# Patient Record
Sex: MALE | Race: WHITE | NOT HISPANIC OR LATINO | URBAN - METROPOLITAN AREA
[De-identification: names, ages, dates, MRNs, and addresses within clinical notes are randomized per-mention and may not be internally consistent; named-entity substitution may affect disease eponyms.]

---

## 2017-03-22 ENCOUNTER — OFFICE VISIT (OUTPATIENT)
Dept: MEDICAL GROUP | Facility: LAB | Age: 37
End: 2017-03-22
Payer: COMMERCIAL

## 2017-03-22 VITALS
DIASTOLIC BLOOD PRESSURE: 66 MMHG | HEART RATE: 77 BPM | HEIGHT: 71 IN | SYSTOLIC BLOOD PRESSURE: 112 MMHG | BODY MASS INDEX: 20.9 KG/M2 | WEIGHT: 149.25 LBS | RESPIRATION RATE: 16 BRPM | TEMPERATURE: 98.2 F | OXYGEN SATURATION: 97 %

## 2017-03-22 DIAGNOSIS — J45.30 MILD PERSISTENT ASTHMA WITHOUT COMPLICATION: ICD-10-CM

## 2017-03-22 DIAGNOSIS — K22.2 LOWER ESOPHAGEAL RING (SCHATZKI): ICD-10-CM

## 2017-03-22 DIAGNOSIS — J30.1 SEASONAL ALLERGIC RHINITIS DUE TO POLLEN: ICD-10-CM

## 2017-03-22 PROCEDURE — 99204 OFFICE O/P NEW MOD 45 MIN: CPT | Performed by: FAMILY MEDICINE

## 2017-03-22 RX ORDER — ALBUTEROL SULFATE 90 UG/1
2 AEROSOL, METERED RESPIRATORY (INHALATION) EVERY 6 HOURS PRN
Qty: 8.5 G | Refills: 11 | Status: SHIPPED | OUTPATIENT
Start: 2017-03-22 | End: 2019-01-08 | Stop reason: SDUPTHER

## 2017-03-22 ASSESSMENT — PATIENT HEALTH QUESTIONNAIRE - PHQ9: CLINICAL INTERPRETATION OF PHQ2 SCORE: 0

## 2017-03-22 NOTE — PROGRESS NOTES
"Lukasz Beck is a 36 y.o. male here for   Chief Complaint   Patient presents with   • Establish Care       HPI:  Lukasz is a very pleasant 36 y.o. Male. He has 3 children. He is planning to become a life kidney donor to one of his friends who has polycystic kidneys.    1. Mild persistent asthma without complication  This is chronic. Patient has a prescription for Advair 100/50 µg. He uses this twice a day. He needs a refill of this medication. He does not have an albuterol inhaler because he has not used this in over 2 years. He does not have chronic cough or wheezing. He has never needed hospitalization. This is worse with allergies.    2. Seasonal allergic rhinitis due to pollen  This is chronic. Patient has had severe allergies when he was a child. 2 years ago, he started using Flonase. Use this for several months and his allergies seemed to dissipate. He has not needed this since. Now, he has postnasal drip and a \"tickle in his throat\". He denies any sneezing, itchy eyes, watery eyes     3. Lower esophageal ring (Schatzki)  this is chronic. Patient was found to have a Schatzki ring as a child. He did have dilation. He is now having difficulty swallowing tough foods. He denies any vomiting, weight loss, or bowel changes.        Current medicines (including changes today)  Current Outpatient Prescriptions   Medication Sig Dispense Refill   • fluticasone-salmeterol (ADVAIR DISKUS) 100-50 MCG/DOSE AEROSOL POWDER, BREATH ACTIVATED Inhale 1 Puff by mouth every 12 hours. 1 Inhaler 11   • albuterol 108 (90 BASE) MCG/ACT Aero Soln inhalation aerosol Inhale 2 Puffs by mouth every 6 hours as needed for Shortness of Breath. 8.5 g 11   • Fluticasone Propionate (FLONASE NA) Spray  in nose.       No current facility-administered medications for this visit.     He  has a past medical history of ASTHMA and Lower esophageal ring (Schatzki).  He  has past surgical history that includes appendectomy (1992); acl " "reconstruction (); dental extraction(s) (); and acl reconstruction scope (2011).  Social History   Substance Use Topics   • Smoking status: Never Smoker    • Smokeless tobacco: Never Used   • Alcohol Use: Yes      Comment: 2 per week     Social History     Social History Narrative     Family History   Problem Relation Age of Onset   • Diabetes     • Heart Disease Mother    • Lung Disease Mother    • Diabetes Father    • Genetic Sister      lupus      Family Status   Relation Status Death Age   • Mother     • Father Alive          ROS  Positive for dysphagia  All other systems reviewed and are negative     Objective:     Blood pressure 112/66, pulse 77, temperature 36.8 °C (98.2 °F), resp. rate 16, height 1.803 m (5' 11\"), weight 67.7 kg (149 lb 4 oz), SpO2 97 %. Body mass index is 20.83 kg/(m^2).  Physical Exam:    Constitutional: Alert, no distress.  Skin: Warm, dry, good turgor, no rashes in visible areas.  Eye: Equal, round and reactive, conjunctiva clear, lids normal.  ENMT: Lips without lesions, good dentition, oropharynx clear. TM's pearly gray with normal light reflexes bilaterally  Neck: Trachea midline, no masses, no thyromegaly. No cervical or supraclavicular lymphadenopathy.  Respiratory: Unlabored respiratory effort, lungs clear to auscultation bilaterally, no wheezes, no ronchi.  Cardiovascular: Normal S1, S2, RRR, no murmur, no edema.  Abdomen: Soft, non-tender, no masses, no hepatosplenomegaly.  Psych: Alert and oriented x3, normal affect and mood.      Assessment and Plan:   The following treatment plan was discussed    1. Mild persistent asthma without complication  This is chronic, stable  Continue Advair twice a day  Prescription for albuterol given to have on hand  - fluticasone-salmeterol (ADVAIR DISKUS) 100-50 MCG/DOSE AEROSOL POWDER, BREATH ACTIVATED; Inhale 1 Puff by mouth every 12 hours.  Dispense: 1 Inhaler; Refill: 11  - albuterol 108 (90 BASE) MCG/ACT Aero Soln " inhalation aerosol; Inhale 2 Puffs by mouth every 6 hours as needed for Shortness of Breath.  Dispense: 8.5 g; Refill: 11    2. Seasonal allergic rhinitis due to pollen  Chronic, stable  6. Restart Flonase, discussed irritation in the back of throat was likely secondary to allergic symptoms. If this does not work, we may need to start a PPI    3. Lower esophageal ring (Jenatzki)  Chronic, worsening  Patient will make an appointment with gastroenterology for possible dilation      Records requested.  Followup: Return in about 1 year (around 3/22/2018), or if symptoms worsen or fail to improve, for Long.         This note was created using voice recognition software. I have made every reasonable attempt to correct errors, however, I do anticipate some grammatical errors.

## 2018-04-23 DIAGNOSIS — J45.30 MILD PERSISTENT ASTHMA WITHOUT COMPLICATION: ICD-10-CM

## 2019-01-08 ENCOUNTER — OFFICE VISIT (OUTPATIENT)
Dept: MEDICAL GROUP | Facility: LAB | Age: 39
End: 2019-01-08
Payer: COMMERCIAL

## 2019-01-08 VITALS
RESPIRATION RATE: 12 BRPM | DIASTOLIC BLOOD PRESSURE: 72 MMHG | SYSTOLIC BLOOD PRESSURE: 100 MMHG | BODY MASS INDEX: 21 KG/M2 | TEMPERATURE: 99 F | HEIGHT: 71 IN | WEIGHT: 150 LBS | HEART RATE: 80 BPM | OXYGEN SATURATION: 96 %

## 2019-01-08 DIAGNOSIS — J30.1 SEASONAL ALLERGIC RHINITIS DUE TO POLLEN: ICD-10-CM

## 2019-01-08 DIAGNOSIS — K22.2 LOWER ESOPHAGEAL RING (SCHATZKI): ICD-10-CM

## 2019-01-08 DIAGNOSIS — Z00.00 ROUTINE GENERAL MEDICAL EXAMINATION AT A HEALTH CARE FACILITY: ICD-10-CM

## 2019-01-08 DIAGNOSIS — J45.30 MILD PERSISTENT ASTHMA WITHOUT COMPLICATION: ICD-10-CM

## 2019-01-08 PROCEDURE — 99395 PREV VISIT EST AGE 18-39: CPT | Performed by: NURSE PRACTITIONER

## 2019-01-08 RX ORDER — ALBUTEROL SULFATE 90 UG/1
2 AEROSOL, METERED RESPIRATORY (INHALATION) EVERY 6 HOURS PRN
Qty: 3 INHALER | Refills: 3 | Status: SHIPPED | OUTPATIENT
Start: 2019-01-08 | End: 2019-11-11 | Stop reason: SDUPTHER

## 2019-01-08 ASSESSMENT — PATIENT HEALTH QUESTIONNAIRE - PHQ9: CLINICAL INTERPRETATION OF PHQ2 SCORE: 0

## 2019-01-08 NOTE — ASSESSMENT & PLAN NOTE
Chronic issue . Improving per pt.  Using advair daily in winter, in warmer months he reduces intake of advair to a few d per week.  Cold air triggers asthma but very rare need for albuterol.  Has not had flu shot.  Nonsmoker.

## 2019-01-08 NOTE — PROGRESS NOTES
"Chief Complaint   Patient presents with   • Annual Exam       HPI   Ed is a 37 yo est male here for annual exam. Doing well.  Denies any specific complaints or concerns.  Working full-time.   with 3 kids.  In the past year he had a unilateral nephrectomy and a vasectomy.  Nephrectomy was for kidney donation.  Mild persistent asthma without complication  Chronic issue . Improving per pt.  Using advair daily in winter, in warmer months he reduces intake of advair to a few d per week.  Cold air triggers asthma but very rare need for albuterol.  Has not had flu shot.  Nonsmoker.     Lower esophageal ring (Schatzki)  Not really bothering pt.   Not choking frequently.      Seasonal allergic rhinitis due to pollen  Chronic and controlled with flonase.  No nose bleeds.    Family History   Problem Relation Age of Onset   • Heart Disease Mother    • Lung Disease Mother    • Diabetes Father         ?   • Diabetes Unknown    • Genetic Sister         lupus      ROS   Constitutional feels generally healthy   Eyes denies blurry or double vision  ENT no pain or epistaxis, no unusual congestion   Cardiac denies chest pain or heart palpitations  Respiratory no hemoptysis, no unusual dyspnea, no cough   GI no pain, indigestion, or unexpected bowel change    denies dysuria or hematuria  Musculoskeletal no unusual or new pains   Skin no suspicious or concerning lesions   Neuro no unusual weakness or loss of function   Psych denies depression or anxiety  Lymph no new or unusual lumps or nodules    Exam:  Blood pressure 100/72, pulse 80, temperature 37.2 °C (99 °F), resp. rate 12, height 1.803 m (5' 11\"), weight 68 kg (150 lb), SpO2 96 %.  Constitutional: Alert, no distress, plus 3 vital signs  Skin:  Warm, dry, no rashes invisible areas  Eye: Equal, round and reactive, conjunctiva clear  ENMT: Lips without lesions, good dentition, oropharynx clear    Neck: Trachea midline, no masses, no thyromegaly  Respiratory: Unlabored " respiration, lungs clear to auscultation, no wheezes, no rhonchi  Cardiovascular: Normal rate and rhythm, no murmur, no edema  Abdomen: Soft, nontender, no masses or hepatosplenomegaly  Psych: Alert, pleasant, well-groomed, normal affect    Assessment /Plan / Medical Decision makin. Routine general medical examination at a health care facility  -recommend updated labs.  Declined flu shot.    - COMP METABOLIC PANEL; Future  - CBC WITH DIFFERENTIAL; Future  - Lipid Profile; Future    2. Mild persistent asthma without complication  -stable.  Continue same.  - fluticasone-salmeterol (ADVAIR DISKUS) 100-50 MCG/DOSE AEROSOL POWDER, BREATH ACTIVATED; Inhale 1 Puff by mouth every 12 hours.  Dispense: 3 Inhaler; Refill: 3  - albuterol 108 (90 Base) MCG/ACT Aero Soln inhalation aerosol; Inhale 2 Puffs by mouth every 6 hours as needed for Shortness of Breath.  Dispense: 3 Inhaler; Refill: 3    3. Lower esophageal ring (Schatzki)  -stable. Declined GI visit.     4. Seasonal allergic rhinitis due to pollen  -stable with flonase.

## 2019-11-11 ENCOUNTER — OFFICE VISIT (OUTPATIENT)
Dept: MEDICAL GROUP | Facility: LAB | Age: 39
End: 2019-11-11
Payer: COMMERCIAL

## 2019-11-11 VITALS
SYSTOLIC BLOOD PRESSURE: 112 MMHG | WEIGHT: 151 LBS | BODY MASS INDEX: 21.14 KG/M2 | RESPIRATION RATE: 12 BRPM | OXYGEN SATURATION: 96 % | HEART RATE: 88 BPM | DIASTOLIC BLOOD PRESSURE: 70 MMHG | HEIGHT: 71 IN | TEMPERATURE: 97.9 F

## 2019-11-11 DIAGNOSIS — K22.2 LOWER ESOPHAGEAL RING (SCHATZKI): ICD-10-CM

## 2019-11-11 DIAGNOSIS — Z00.00 PREVENTATIVE HEALTH CARE: ICD-10-CM

## 2019-11-11 DIAGNOSIS — J45.30 MILD PERSISTENT ASTHMA WITHOUT COMPLICATION: ICD-10-CM

## 2019-11-11 DIAGNOSIS — N52.9 ERECTILE DYSFUNCTION, UNSPECIFIED ERECTILE DYSFUNCTION TYPE: ICD-10-CM

## 2019-11-11 DIAGNOSIS — J30.1 SEASONAL ALLERGIC RHINITIS DUE TO POLLEN: ICD-10-CM

## 2019-11-11 PROCEDURE — 99214 OFFICE O/P EST MOD 30 MIN: CPT | Performed by: NURSE PRACTITIONER

## 2019-11-11 RX ORDER — ALBUTEROL SULFATE 90 UG/1
2 AEROSOL, METERED RESPIRATORY (INHALATION) EVERY 6 HOURS PRN
Qty: 3 INHALER | Refills: 3 | Status: SHIPPED | OUTPATIENT
Start: 2019-11-11 | End: 2022-09-13 | Stop reason: SDUPTHER

## 2019-11-11 NOTE — ASSESSMENT & PLAN NOTE
"Chronic issue.  Winter is typically a trigger for his asthma.  Also mentions that animal dander / dust are triggers.  Tells me that asthma is currently well controlled with advair bid.  Using albuterol very rarely \"almost never.\"    "

## 2019-11-11 NOTE — ASSESSMENT & PLAN NOTE
Chronic issue.  Not too bothersome for pt right now, rarely bothering pt.  Occasional thick meats get a bit stuck occasionally - relieved by water intake.

## 2019-11-11 NOTE — PROGRESS NOTES
"Chief Complaint   Patient presents with   • Asthma     med refill        HPI   Ed is a 37 yo est male here to follow-up on chronic issues, also with complaint of new onset difficulty obtaining and maintaining an erection, see below.    #1-Erectile dysfunction: New issue for the patient over the past year and a half.  He tells me that he has had trouble obtaining and maintaining an erection ever since he donated a kidney 1.5 yrs ago in .  He is been reading about, difficulties with erections after kidney donation and would like to see a urologist for further investigation.  He denies dyspareunia, hematuria, dysuria, flank pain    #2-mild persistent asthma without complication  Chronic issue.  Currently doing well, per patient.  Winter is typically a trigger for his asthma.  Also mentions that animal dander / dust are triggers.  Tells me that asthma is currently well controlled with advair bid.  Using albuterol very rarely \"almost never.\"      #3-seasonal allergic rhinitis due to pollen  States his allergies have been very mild for the past 10 years.  Using flonase a few x per year.     #4-lower esophageal ring (Schatzki)  Chronic issue.  Not too bothersome for pt right now, rarely bothering pt.  Occasional thick meats get a bit stuck occasionally - relieved by water intake.       Past medical, surgical, family, and social history is reviewed and updated in Epic chart by me today.   Medications and allergies reviewed and updated in Epic chart by me today.     ROS:   As documented in history of present illness above    Exam:  /70 (BP Location: Right arm, Patient Position: Sitting, BP Cuff Size: Adult)   Pulse 88   Temp 36.6 °C (97.9 °F)   Resp 12   Ht 1.803 m (5' 11\")   Wt 68.5 kg (151 lb)   SpO2 96%   Constitutional: Alert, no distress, plus 3 vital signs  Skin:  Warm, dry, no rashes invisible areas  Eye: Equal, round and reactive, conjunctiva clear  ENMT: Lips without lesions, good dentition, oropharynx " clear    Neck: Trachea midline, no masses, no thyromegaly  Respiratory: Unlabored respiration, lungs clear to auscultation, no wheezes, no rhonchi  Cardiovascular: Normal rate and rhythm, no murmur, no edema  Psych: Alert, pleasant, well-groomed, normal affect    Assessment / Plan / Medical Decision makin. Mild persistent asthma without complication  -Refilled all medications.  Very well controlled.  Flu vaccine is up-to-date  - albuterol 108 (90 Base) MCG/ACT Aero Soln inhalation aerosol; Inhale 2 Puffs by mouth every 6 hours as needed for Shortness of Breath.  Dispense: 3 Inhaler; Refill: 3  - fluticasone-salmeterol (ADVAIR DISKUS) 100-50 MCG/DOSE AEROSOL POWDER, BREATH ACTIVATED; Inhale 1 Puff by mouth every 12 hours.  Dispense: 3 Inhaler; Refill: 3    2. Seasonal allergic rhinitis due to pollen  -Stable.  Continue same.    3. Lower esophageal ring (Schatzki)  -Currently stable.  Declines referrals to GI.    4. Preventative health care  - Comp Metabolic Panel; Future  - CBC WITH DIFFERENTIAL; Future  - Lipid Profile; Future    5. Erectile dysfunction, unspecified erectile dysfunction type  -Recommend a consult with urology and referral was placed.  - REFERRAL TO UROLOGY

## 2021-01-20 ENCOUNTER — OFFICE VISIT (OUTPATIENT)
Dept: MEDICAL GROUP | Facility: LAB | Age: 41
End: 2021-01-20
Payer: COMMERCIAL

## 2021-01-20 VITALS
OXYGEN SATURATION: 98 % | TEMPERATURE: 97.8 F | BODY MASS INDEX: 20.72 KG/M2 | HEIGHT: 71 IN | DIASTOLIC BLOOD PRESSURE: 64 MMHG | HEART RATE: 86 BPM | WEIGHT: 148 LBS | SYSTOLIC BLOOD PRESSURE: 98 MMHG

## 2021-01-20 DIAGNOSIS — J45.30 MILD PERSISTENT ASTHMA WITHOUT COMPLICATION: ICD-10-CM

## 2021-01-20 DIAGNOSIS — Z00.00 PREVENTATIVE HEALTH CARE: ICD-10-CM

## 2021-01-20 PROBLEM — Z52.4 KIDNEY DONOR: Status: ACTIVE | Noted: 2018-02-16

## 2021-01-20 PROCEDURE — 99213 OFFICE O/P EST LOW 20 MIN: CPT | Performed by: NURSE PRACTITIONER

## 2021-01-20 ASSESSMENT — PATIENT HEALTH QUESTIONNAIRE - PHQ9: CLINICAL INTERPRETATION OF PHQ2 SCORE: 0

## 2021-01-20 NOTE — PROGRESS NOTES
"CC  Asthma f/u    HPI  Ed is a 40-year-old established male here to follow-up on asthma.  Asthma doing well - cold air is a trigger, causing tightness periodically. No recent bouts of bronchitis or flare ups.  Starts advair in fall and continues through winter, takes a break from advair in summer.  Dogs / cats are triggers - does not have these in the home.  Typical use of albuterol is less than once per week as long as he is using Advair in the winter.  Exercises regularly at work,  [employer].  Allergies have been very minimal.    Prefers not to get a flu or pneumonia shot.      Past medical, surgical, family, and social history is reviewed and updated in Epic chart by me today.   Medications and allergies reviewed and updated in Epic chart by me today.     ROS:   As documented in history of present illness above    Exam:  BP (!) 98/64 (BP Location: Right arm, Patient Position: Sitting)   Pulse 86   Temp 36.6 °C (97.8 °F)   Ht 1.803 m (5' 11\")   Wt 67.1 kg (148 lb)   SpO2 98%   Constitutional: Alert, no distress, plus 3 vital signs  Skin:  Warm, dry, no rashes invisible areas  Eye: Equal, round and reactive, conjunctiva clear  Respiratory: Unlabored respiration, lungs clear to auscultation, no wheezes, no rhonchi  Cardiovascular: Normal rate and rhythm, no murmur, no edema  Psych: Alert, pleasant, well-groomed, normal affect    Assessment / Plan / Medical Decision making:   \"  1. Mild persistent asthma without complication  fluticasone-salmeterol (ADVAIR DISKUS) 100-50 MCG/DOSE AEROSOL POWDER, BREATH ACTIVATED   2. Preventative health care  Comp Metabolic Panel    CBC WITH DIFFERENTIAL    Lipid Profile   \"  Refilled Advair.  Has plenty of albuterol.  Declines influenza and pneumonia vaccines.  Counseled regarding Covid vaccination.  Recommend preventative lab work.  Follow-up yearly.    "

## 2022-02-10 DIAGNOSIS — J45.30 MILD PERSISTENT ASTHMA WITHOUT COMPLICATION: ICD-10-CM

## 2022-02-24 ENCOUNTER — OFFICE VISIT (OUTPATIENT)
Dept: MEDICAL GROUP | Facility: LAB | Age: 42
End: 2022-02-24
Payer: COMMERCIAL

## 2022-02-24 VITALS
BODY MASS INDEX: 21 KG/M2 | DIASTOLIC BLOOD PRESSURE: 66 MMHG | TEMPERATURE: 96.8 F | RESPIRATION RATE: 12 BRPM | HEIGHT: 71 IN | SYSTOLIC BLOOD PRESSURE: 96 MMHG | HEART RATE: 84 BPM | WEIGHT: 150 LBS | OXYGEN SATURATION: 95 %

## 2022-02-24 DIAGNOSIS — J45.30 MILD PERSISTENT ASTHMA WITHOUT COMPLICATION: ICD-10-CM

## 2022-02-24 DIAGNOSIS — Z00.00 WELL ADULT EXAM: ICD-10-CM

## 2022-02-24 DIAGNOSIS — K22.2 LOWER ESOPHAGEAL RING (SCHATZKI): ICD-10-CM

## 2022-02-24 PROCEDURE — 99396 PREV VISIT EST AGE 40-64: CPT | Performed by: NURSE PRACTITIONER

## 2022-02-24 RX ORDER — MONTELUKAST SODIUM 10 MG/1
10 TABLET ORAL DAILY
Qty: 30 TABLET | Refills: 5 | Status: SHIPPED | OUTPATIENT
Start: 2022-02-24 | End: 2023-01-09

## 2022-02-24 ASSESSMENT — PATIENT HEALTH QUESTIONNAIRE - PHQ9: CLINICAL INTERPRETATION OF PHQ2 SCORE: 0

## 2022-02-24 NOTE — PROGRESS NOTES
Subjective:     CC:   Chief Complaint   Patient presents with   • Annual Exam       HPI:   Ed is a 41 y.o. male who presents for annual exam.    Symptoms of asthma worse in winter - using albuterol 2-4 x everyday.  Uses advair 100/50 everyday, often misses second dose.   Not on singular. Needs albuterol much more often during work week when he is very physically active working at  [employer].   Asthma has improved with new mask mandate drop.  Known Schatzki ring noted age 15 due to choking easily on food, hasn't had an endoscopy since and ready to return there - choking easily on food of any type of mashed potatoes.      Bowels/ bladder moving well.       He  has a past medical history of ASTHMA and Lower esophageal ring (Gema).  He  has a past surgical history that includes appendectomy (1992); reconstruction, knee, acl (2003); dental extraction(s) (1997); reconstruction, knee, acl, arthroscopic (2/22/2011); vasectomy; and nephrectomy laparoscopic.    Family History   Problem Relation Age of Onset   • Heart Disease Mother    • Lung Disease Mother    • Diabetes Mother         dx age 60   • Dementia Father    • Diabetes Other    • Genetic Disorder Sister         lupus    • Cancer Sister 50        breast   • Lung Disease Brother         asthma     Social History     Tobacco Use   • Smoking status: Never Smoker   • Smokeless tobacco: Never Used   Substance Use Topics   • Alcohol use: Yes     Comment: 2 per week   • Drug use: No     He  reports being sexually active and has had partner(s) who are female.    Patient Active Problem List    Diagnosis Date Noted   • Kidney donor 02/16/2018   • Mild persistent asthma without complication 03/22/2017   • Seasonal allergic rhinitis due to pollen 03/22/2017   • Lower esophageal ring (Gema)      Current Outpatient Medications   Medication Sig Dispense Refill   • fluticasone-salmeterol (ADVAIR DISKUS) 100-50 MCG/DOSE AEROSOL POWDER, BREATH ACTIVATED Inhale 1 Puff every 12  "hours. 3 Each 3   • albuterol 108 (90 Base) MCG/ACT Aero Soln inhalation aerosol Inhale 2 Puffs by mouth every 6 hours as needed for Shortness of Breath. 3 Inhaler 3   • Fluticasone Propionate (FLONASE NA) Spray  in nose.       No current facility-administered medications for this visit.     No Known Allergies    Review of Systems   Constitutional: Negative for fever, chills and malaise/fatigue.   HENT: Negative for congestion.    Eyes: Negative for pain.   Respiratory: Negative for cough and shortness of breath.    Cardiovascular: Negative for chest pain and leg swelling.   Gastrointestinal: Negative for nausea, vomiting, abdominal pain and diarrhea.   Genitourinary: Negative for dysuria and hematuria.   Skin: Negative for rash.   Neurological: Negative for dizziness, focal weakness and headaches.   Endo/Heme/Allergies: Does not bruise/bleed easily.   Psychiatric/Behavioral: Negative for depression.  The patient is not nervous/anxious.      Objective:   BP (!) 96/66 (BP Location: Left arm, Patient Position: Sitting, BP Cuff Size: Adult)   Pulse 84   Temp 36 °C (96.8 °F)   Resp 12   Ht 1.803 m (5' 11\")   Wt 68 kg (150 lb)   SpO2 95%   BMI 20.92 kg/m²      Wt Readings from Last 4 Encounters:   02/24/22 68 kg (150 lb)   01/20/21 67.1 kg (148 lb)   11/11/19 68.5 kg (151 lb)   01/08/19 68 kg (150 lb)       Physical Exam:  Constitutional: Well-developed and well-nourished. Not diaphoretic. No distress.   Skin: Skin is warm and dry. No rash noted.  Head: Atraumatic without lesions.  Eyes: Conjunctivae and extraocular motions are normal. Pupils are equal, round, and reactive to light. No scleral icterus.   Ears:  External ears unremarkable. Tympanic membranes clear and intact.  Nose: Nares patent. Septum midline. Turbinates without erythema nor edema. No discharge.   Mouth/Throat: Tongue normal. Oropharynx is clear and moist. Posterior pharynx without erythema or exudates.  Neck: Supple, trachea midline. Normal " "range of motion. No thyromegaly present. No lymphadenopathy--cervical or supraclavicular.  Cardiovascular: Regular rate and rhythm, S1 and S2 without murmur, rubs, or gallops.    Respiratory: Effort normal. Clear to auscultation throughout. No adventitious sounds.   Abdomen: Soft, non tender, and without distention. Active bowel sounds in all four quadrants. No rebound, guarding, masses or HSM.  Extremities: No cyanosis, clubbing, erythema, nor edema. Distal pulses intact and symmetric.   Musculoskeletal: All major joints AROM full in all directions without pain.  Neurological: Alert and oriented x 3. Grossly non-focal. Strength and sensation grossly intact. DTRs 2+/3 and symmetric.   Psychiatric:  Behavior, mood, and affect are appropriate.      Assessment and Plan:   \"  1. Well adult exam  Comp Metabolic Panel    CBC WITH DIFFERENTIAL    Lipid Profile    TSH   2. Mild persistent asthma without complication  montelukast (SINGULAIR) 10 MG Tab   3. Lower esophageal ring (Schatzki)  Referral to Gastroenterology   \"  Recommend a full updated lab panel, last was approximately 2018. He is interested in seeing an allergy/asthma specialist as his brother is currently receiving a new treatment that has prevented asthma symptoms/caused him to rarely uses albuterol and he will email me with this information so that I may place a referral for him. He would like to see the allergy specialist that his brother is seeing.  Also placed a referral to GI as it sounds like he needs to have Schatzki's ring dilation and encouraged him to check his MyChart to have this scheduled ASAP and we discussed choking prevention in the meantime.  He did not need refills of any of his medications today.  I did start him on Singulair related to his frequent use of albuterol. Discussed how to take Singulair as well as potential side effects and he will contact me if this is not reducing his dependence on albuterol over the next 2 weeks.    "

## 2022-09-04 ENCOUNTER — OFFICE VISIT (OUTPATIENT)
Dept: URGENT CARE | Facility: CLINIC | Age: 42
End: 2022-09-04
Payer: COMMERCIAL

## 2022-09-04 VITALS
RESPIRATION RATE: 14 BRPM | HEART RATE: 84 BPM | BODY MASS INDEX: 23.1 KG/M2 | WEIGHT: 165 LBS | TEMPERATURE: 98.3 F | OXYGEN SATURATION: 95 % | HEIGHT: 71 IN | SYSTOLIC BLOOD PRESSURE: 112 MMHG | DIASTOLIC BLOOD PRESSURE: 78 MMHG

## 2022-09-04 DIAGNOSIS — S81.812A LACERATION OF LEFT LOWER EXTREMITY, INITIAL ENCOUNTER: ICD-10-CM

## 2022-09-04 PROCEDURE — 12001 RPR S/N/AX/GEN/TRNK 2.5CM/<: CPT | Performed by: NURSE PRACTITIONER

## 2022-09-04 RX ORDER — OMEPRAZOLE 20 MG/1
20 CAPSULE, DELAYED RELEASE ORAL
COMMUNITY
Start: 2022-06-29 | End: 2023-04-17

## 2022-09-04 RX ORDER — PANTOPRAZOLE SODIUM 40 MG/1
40 TABLET, DELAYED RELEASE ORAL 2 TIMES DAILY
COMMUNITY
Start: 2022-06-23 | End: 2023-04-17

## 2022-09-04 NOTE — PROGRESS NOTES
Lukasz Beck is a 41 y.o. male who presents for Wound Check (X1days, Piece of rock hit him in shin, pt is concerned it may not be healing right, deeper than pt thought, bleeding off and on pt states there are some different colored fluids as well )      HPI Lukasz is a 41-year-old male who presents with a puncture wound to his left shin.  He was working in his yard trying to take out a rock , using a sledge hammer. A piece of the rock broke off and flew into his lower leg causing a puncture wound. The wound was washed with soap and water then bandaged. It continues to bleed if he bumps it. No other aggravating or alleviating factors.   Tdap 2016    ROS    Allergies:     No Known Allergies    PMSFS Hx:  Past Medical History:   Diagnosis Date    ASTHMA     Lower esophageal ring (Gema)      Past Surgical History:   Procedure Laterality Date    RECONSTRUCTION, KNEE, ACL, ARTHROSCOPIC  2/22/2011    Performed by CHALO PATEL at SURGERY SAME DAY [facility] ORS    RECONSTRUCTION, KNEE, ACL  2003    left    DENTAL EXTRACTION(S)  1997    APPENDECTOMY  1992    NEPHRECTOMY LAPAROSCOPIC      left 2018    VASECTOMY       Family History   Problem Relation Age of Onset    Heart Disease Mother     Lung Disease Mother     Diabetes Mother         dx age 60    Dementia Father     Diabetes Other     Genetic Disorder Sister         lupus     Cancer Sister 50        breast    Lung Disease Brother         asthma     Social History     Tobacco Use    Smoking status: Never    Smokeless tobacco: Never   Substance Use Topics    Alcohol use: Yes     Comment: 2 per week       Problems:   Patient Active Problem List   Diagnosis    Mild persistent asthma without complication    Lower esophageal ring (Gema)    Seasonal allergic rhinitis due to pollen    Kidney donor       Medications:   Current Outpatient Medications on File Prior to Visit   Medication Sig Dispense Refill    omeprazole (PRILOSEC) 20 MG delayed-release capsule Take  "20 mg by mouth every day.      pantoprazole (PROTONIX) 40 MG Tablet Delayed Response Take 40 mg by mouth 2 times a day.      montelukast (SINGULAIR) 10 MG Tab Take 1 Tablet by mouth every day. 30 Tablet 5    fluticasone-salmeterol (ADVAIR DISKUS) 100-50 MCG/DOSE AEROSOL POWDER, BREATH ACTIVATED Inhale 1 Puff every 12 hours. 3 Each 3    albuterol 108 (90 Base) MCG/ACT Aero Soln inhalation aerosol Inhale 2 Puffs by mouth every 6 hours as needed for Shortness of Breath. 3 Inhaler 3    Fluticasone Propionate (FLONASE NA) Spray  in nose.       No current facility-administered medications on file prior to visit.          Objective:     /78   Pulse 84   Temp 36.8 °C (98.3 °F) (Temporal)   Resp 14   Ht 1.803 m (5' 11\")   Wt 74.8 kg (165 lb)   SpO2 95%   BMI 23.01 kg/m²     Physical Exam  Vitals and nursing note reviewed.   Cardiovascular:      Rate and Rhythm: Normal rate.      Pulses: Normal pulses.   Skin:     General: Skin is warm.      Capillary Refill: Capillary refill takes less than 2 seconds.          Neurological:      Mental Status: He is alert and oriented to person, place, and time.     Procedure: Laceration Repair   -Risks benefits and alternatives discussed including bleeding, nerve damage, infection, and poor cosmetic outcome discussed at length. Benefits and alternatives discussed. Consent was obtained for repair of laceration    Wound length 1 cm, location Left shin   straight laceration, subcut tissue visible   Wound NVI, No signs of tendon injury   Area extensively irrigated with NS, wound explored, No fb identified.   Under clean conditions, I  used topical lidocaine gel 2% and FreezeEase for local anesthia of the area. Good anesthesia was obtained. Under sterile conditions, I applied 1 sutures with 5.0 ethilon interrupted sutures with good wound edge approximation.  last tetanus booster within 10 years   Bleeding was controlled. There were no procedural complications. Patient tolerated " procedure well. Dressing was applied and wound care/follow up instructions were given.  Keep clean and dry for 24 hours then clean daily with mild soap and water. Return for s/s of infections ( swelling, pain, redness, pus, fever)   Suture removal 7-10  days.   Patients questions were answered.      Assessment /Associated Orders:      1. Laceration of left lower extremity, initial encounter              Medical Decision Making:    Pt is clinically stable at today's acute urgent care visit.  No acute distress noted. Appropriate for outpatient care at this time.   Acute problem today  that required surgical closure of open wound.   Tdap current   Educated in wound care.   SR 7-10 days.         Please note that this dictation was created using voice recognition software. I have worked with consultants from the vendor as well as technical experts from [facility] [employer] to optimize the interface. I have made every reasonable attempt to correct obvious errors, but I expect that there are errors of grammar and possibly content that I did not discover before finalizing the note.  This note was electronically signed by provider

## 2022-09-13 DIAGNOSIS — J45.30 MILD PERSISTENT ASTHMA WITHOUT COMPLICATION: ICD-10-CM

## 2022-09-14 RX ORDER — ALBUTEROL SULFATE 90 UG/1
2 AEROSOL, METERED RESPIRATORY (INHALATION) EVERY 6 HOURS PRN
Qty: 3 EACH | Refills: 3 | Status: SHIPPED | OUTPATIENT
Start: 2022-09-14 | End: 2023-04-17 | Stop reason: SDUPTHER

## 2022-09-14 NOTE — TELEPHONE ENCOUNTER
Received request via: Pharmacy    Was the patient seen in the last year in this department? Yes  2/24/22  Does the patient have an active prescription (recently filled or refills available) for medication(s) requested? No

## 2023-01-07 DIAGNOSIS — J45.30 MILD PERSISTENT ASTHMA WITHOUT COMPLICATION: ICD-10-CM

## 2023-01-09 RX ORDER — MONTELUKAST SODIUM 10 MG/1
10 TABLET ORAL DAILY
Qty: 30 TABLET | Refills: 5 | Status: SHIPPED | OUTPATIENT
Start: 2023-01-09 | End: 2023-04-17 | Stop reason: SDUPTHER

## 2023-01-09 NOTE — TELEPHONE ENCOUNTER
Received request via: Pharmacy    Was the patient seen in the last year in this department? Yes  2/24/2022  Does the patient have an active prescription (recently filled or refills available) for medication(s) requested? No    Does the patient have [facility] and need 100 day supply (blood pressure, diabetes and cholesterol meds only)? Patient does not have SCP

## 2023-04-17 ENCOUNTER — OFFICE VISIT (OUTPATIENT)
Dept: MEDICAL GROUP | Facility: LAB | Age: 43
End: 2023-04-17
Payer: COMMERCIAL

## 2023-04-17 VITALS
SYSTOLIC BLOOD PRESSURE: 98 MMHG | BODY MASS INDEX: 21.98 KG/M2 | WEIGHT: 157 LBS | OXYGEN SATURATION: 92 % | RESPIRATION RATE: 12 BRPM | TEMPERATURE: 96.9 F | HEART RATE: 78 BPM | HEIGHT: 71 IN | DIASTOLIC BLOOD PRESSURE: 64 MMHG

## 2023-04-17 DIAGNOSIS — Z23 NEED FOR PNEUMOCOCCAL VACCINATION: ICD-10-CM

## 2023-04-17 DIAGNOSIS — Z00.00 PREVENTATIVE HEALTH CARE: ICD-10-CM

## 2023-04-17 DIAGNOSIS — J45.30 MILD PERSISTENT ASTHMA WITHOUT COMPLICATION: ICD-10-CM

## 2023-04-17 DIAGNOSIS — K20.0 EOSINOPHILIC ESOPHAGITIS: ICD-10-CM

## 2023-04-17 PROCEDURE — 99214 OFFICE O/P EST MOD 30 MIN: CPT | Mod: 25 | Performed by: NURSE PRACTITIONER

## 2023-04-17 PROCEDURE — 90677 PCV20 VACCINE IM: CPT | Performed by: NURSE PRACTITIONER

## 2023-04-17 PROCEDURE — 90471 IMMUNIZATION ADMIN: CPT | Performed by: NURSE PRACTITIONER

## 2023-04-17 RX ORDER — ALBUTEROL SULFATE 90 UG/1
2 AEROSOL, METERED RESPIRATORY (INHALATION) EVERY 6 HOURS PRN
Qty: 3 EACH | Refills: 3 | Status: SHIPPED | OUTPATIENT
Start: 2023-04-17

## 2023-04-17 RX ORDER — MONTELUKAST SODIUM 10 MG/1
10 TABLET ORAL DAILY
Qty: 90 TABLET | Refills: 3 | Status: SHIPPED | OUTPATIENT
Start: 2023-04-17

## 2023-04-17 RX ORDER — FLUTICASONE PROPIONATE AND SALMETEROL 100; 50 UG/1; UG/1
1 POWDER RESPIRATORY (INHALATION) EVERY 12 HOURS
Qty: 3 EACH | Refills: 3 | Status: SHIPPED | OUTPATIENT
Start: 2023-04-17

## 2023-04-17 ASSESSMENT — PATIENT HEALTH QUESTIONNAIRE - PHQ9: CLINICAL INTERPRETATION OF PHQ2 SCORE: 0

## 2023-04-17 NOTE — PROGRESS NOTES
"Chief Complaint   Patient presents with    Medication Management     Refill advair       HPI:  Ed is 43 yo est male here to f/u on chronic issues:     Eosinophilic esophagitis  Chronic issue.  Followed by GI and an allergist.  Tried PPI therapy - didn't help.  Had repeat biopsy in the past year which showed EE.  On advair.    Now seeing an allergist - [facility]-  Hoping this will help.  (Dr. Scherer).  Plan is possibly starting allergy shots.  Struggling to pinpoint exact triggers that set off allergy and asthma symptoms.  Has not frequently had to take any type of corticosteroid.  Does not smoke.    Mild persistent asthma without complication  Chronic issue.  Flaring up along with allergies during our current transition from winter to spring.  On advair bid, flonase, singular and albuterol prn.  No hx of antihistamine use helping.  Does not have a scheduled follow-up with his allergist at this time.    Exam:  BP 98/64 (BP Location: Left arm, Patient Position: Sitting, BP Cuff Size: Adult)   Pulse 78   Temp 36.1 °C (96.9 °F)   Resp 12   Ht 1.803 m (5' 11\")   Wt 71.2 kg (157 lb)   SpO2 92%   Gen: NAD  Resp: nonlabored.  Able to speak in full sentences. CTA bilaterally.   CV: Regular rate and rhythm  Psy: pleasant / cooperative.   Neuro:  Alert and oriented x 3      A/P:  1. Eosinophilic esophagitis  -Reviewed notes from November's endoscopy and pathology report.  His plan is to follow-up with GI and then his allergist for a more definitive long-term plan.  At this point he would like to keep his Advair at current dosage, continue Flonase and Singulair.  He was interested in another opinion through a gastroenterologist and I encouraged him to consult Dr. Jimenez at [facility].    2. Mild persistent asthma without complication  -Stable.  Discussed the use of rescue corticosteroids as needed for increased wheezing, torturous allergy symptoms or bronchitis type symptoms that do not resolve within 10 " days.  -Encouraged him to make a follow-up with his allergist.  - montelukast (SINGULAIR) 10 MG Tab; Take 1 Tablet by mouth every day.  Dispense: 90 Tablet; Refill: 3  - fluticasone-salmeterol (ADVAIR) 100-50 MCG/ACT AEROSOL POWDER, BREATH ACTIVATED; Inhale 1 Puff every 12 hours.  Dispense: 3 Each; Refill: 3  - albuterol 108 (90 Base) MCG/ACT Aero Soln inhalation aerosol; Inhale 2 Puffs every 6 hours as needed for Shortness of Breath.  Dispense: 3 Each; Refill: 3    3. Need for pneumococcal vaccination  -Updated PCV 20.  Patient was counseled regarding all immunizations, what the patient is being immunized against, possible side effects, and the importance of immunization.  - Pneumococcal Conjugate Vaccine 20-Valent (19 yrs+)    4. Preventative health care  -Recommend updated lab panel.  - TSH WITH REFLEX TO FT4; Future  - Lipid Profile; Future  - Comp Metabolic Panel; Future  - CBC WITH DIFFERENTIAL; Future     Hcm:  recommend colonoscopy age 45.  Psa age 50.

## 2023-04-17 NOTE — ASSESSMENT & PLAN NOTE
Chronic issue.  Followed by GI.  Tried PPI therapy - didn't help.  Had repeat biopsy in the past year which showed EE.  On advair.    Now seeing an allergist - [facility]-  Hoping this will help.  (Dr. Scherer).  Plan is possibly starting allergy shot.

## 2023-04-17 NOTE — ASSESSMENT & PLAN NOTE
Chronic issue.  Flaring up along with allergies during our current transition from winter to spring.  On advair bid, flonase, singular and albuterol prn.  No hx of antihistamine use helping.

## 2024-02-08 ENCOUNTER — APPOINTMENT (OUTPATIENT)
Dept: MEDICAL GROUP | Facility: LAB | Age: 44
End: 2024-02-08
Payer: COMMERCIAL

## 2024-02-08 ENCOUNTER — OFFICE VISIT (OUTPATIENT)
Dept: MEDICAL GROUP | Facility: LAB | Age: 44
End: 2024-02-08
Payer: COMMERCIAL

## 2024-02-08 VITALS
OXYGEN SATURATION: 95 % | WEIGHT: 151 LBS | RESPIRATION RATE: 12 BRPM | DIASTOLIC BLOOD PRESSURE: 78 MMHG | TEMPERATURE: 96 F | BODY MASS INDEX: 21.14 KG/M2 | SYSTOLIC BLOOD PRESSURE: 100 MMHG | HEART RATE: 84 BPM | HEIGHT: 71 IN

## 2024-02-08 DIAGNOSIS — M79.671 RIGHT FOOT PAIN: ICD-10-CM

## 2024-02-08 PROCEDURE — 3078F DIAST BP <80 MM HG: CPT | Performed by: NURSE PRACTITIONER

## 2024-02-08 PROCEDURE — 99213 OFFICE O/P EST LOW 20 MIN: CPT | Performed by: NURSE PRACTITIONER

## 2024-02-08 PROCEDURE — 3074F SYST BP LT 130 MM HG: CPT | Performed by: NURSE PRACTITIONER

## 2024-02-08 RX ORDER — METHYLPREDNISOLONE 4 MG/1
TABLET ORAL
Qty: 21 TABLET | Refills: 0 | Status: SHIPPED | OUTPATIENT
Start: 2024-02-08

## 2024-02-08 ASSESSMENT — PATIENT HEALTH QUESTIONNAIRE - PHQ9: CLINICAL INTERPRETATION OF PHQ2 SCORE: 0

## 2024-02-08 NOTE — PROGRESS NOTES
"Chief Complaint   Patient presents with    Foot Problem     Right  pain X 2 months worsening       HPI:  Ed is a 42 yo est male here with c/o new onset right foot pain:  x a few mo - points to top / lateral.  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  Worsening and limping in the AM.  Stiff in AM / worse in AM / PM but not middle of day.  Denies trauma.   Walks at least 10-15 miles at work x 5 d a week.    No hx of foot issues.    Alternates between a flat soccer type shoe and Doc Vasquez.  Only has 1 kidney, donated his other kidney.    Exam:   /78 (BP Location: Left arm, Patient Position: Sitting, BP Cuff Size: Adult)   Pulse 84   Temp (!) 35.6 °C (96 °F)   Resp 12   Ht 1.803 m (5' 11\")   Wt 68.5 kg (151 lb)   SpO2 95%   BMI 21.06 kg/m²   Gen: NAD  Resp: nonlabored.  Able to speak in full sentences  Psy: pleasant / cooperative.   Musculoskeletal: He does have tenderness overlying cuboid and fourth tarsometatarsal joint as well as tenderness to distal lateral cuneiform and navicular region, laterally only.  No overlying redness, bruising or deformed he also experiences discomfort to the same areas as previously mentioned with active resistance to dorsiflexion and plantarflexion.  He does not have bony tenderness to his phalanges or metatarsals.  Neuro:  Alert and oriented x 3    Assessment / Plan:    1. Right foot pain  methylPREDNISolone (MEDROL DOSEPAK) 4 MG Tablet Therapy Pack    DX-FOOT-COMPLETE 3+ RIGHT      New issue.  Likely osteoarthritis considering lack of trauma and morning stiffness/improvement with use during the day.  Initially we discussed Celebrex for likely osteoarthritis flareup but decided to go with the Medrol pack as he only has 1 kidney.  Discussed side effects of steroids such as wakefulness, increased hunger and irritability.  Recommend x-ray as well.  I will follow-up with him after x-ray results and within about a week to see how he feels after a steroid pack.  We discussed " trying various different types of more supportive shoes.  Briefly discussed physical therapy and he does have a cousin that works at [employer] that he will consult/let me know if he wants a formal referral.    Side effects of all medications prescribed today were discussed with the patient including how to take the medications and proper dosage. Discussed repercussions of not taking the medications as prescribed. Instructed to call the office should she have any negative side effects or problems with the medications.

## 2024-04-17 DIAGNOSIS — J45.30 MILD PERSISTENT ASTHMA WITHOUT COMPLICATION: ICD-10-CM

## 2024-04-17 RX ORDER — MONTELUKAST SODIUM 10 MG/1
10 TABLET ORAL DAILY
Qty: 90 TABLET | Refills: 3 | Status: SHIPPED | OUTPATIENT
Start: 2024-04-17

## 2024-04-17 NOTE — TELEPHONE ENCOUNTER
Received request via: Pharmacy    Was the patient seen in the last year in this department? Yes  2/8/24  Does the patient have an active prescription (recently filled or refills available) for medication(s) requested? No    [facility]    Does the patient have [facility] and need 100 day supply (blood pressure, diabetes and cholesterol meds only)? Medication is not for cholesterol, blood pressure or diabetes

## 2024-04-18 DIAGNOSIS — J45.30 MILD PERSISTENT ASTHMA WITHOUT COMPLICATION: ICD-10-CM

## 2024-04-18 RX ORDER — FLUTICASONE PROPIONATE AND SALMETEROL 100; 50 UG/1; UG/1
1 POWDER RESPIRATORY (INHALATION) EVERY 12 HOURS
Qty: 180 EACH | Refills: 3 | Status: SHIPPED | OUTPATIENT
Start: 2024-04-18

## 2024-07-02 ENCOUNTER — PATIENT MESSAGE (OUTPATIENT)
Dept: MEDICAL GROUP | Facility: LAB | Age: 44
End: 2024-07-02
Payer: COMMERCIAL

## 2024-07-02 RX ORDER — MONTELUKAST SODIUM 10 MG/1
10 TABLET ORAL DAILY
Qty: 30 TABLET | Refills: 3 | Status: SHIPPED | OUTPATIENT
Start: 2024-07-02

## 2024-11-06 DIAGNOSIS — J45.30 MILD PERSISTENT ASTHMA WITHOUT COMPLICATION: ICD-10-CM

## 2024-11-06 RX ORDER — ALBUTEROL SULFATE 90 UG/1
2 INHALANT RESPIRATORY (INHALATION) EVERY 6 HOURS PRN
Qty: 3 EACH | Refills: 3 | Status: SHIPPED | OUTPATIENT
Start: 2024-11-06

## 2024-11-06 NOTE — TELEPHONE ENCOUNTER
Received request via: Pharmacy    Was the patient seen in the last year in this department? Yes  LOV : 2/8/2024   Does the patient have an active prescription (recently filled or refills available) for medication(s) requested? No    [facility]    Does the patient have [facility] and need 100-day supply? (This applies to ALL medications) Patient does not have SCP

## 2025-04-28 DIAGNOSIS — J45.30 MILD PERSISTENT ASTHMA WITHOUT COMPLICATION: ICD-10-CM

## 2025-04-28 RX ORDER — FLUTICASONE PROPIONATE AND SALMETEROL 100; 50 UG/1; UG/1
1 POWDER RESPIRATORY (INHALATION) EVERY 12 HOURS
Qty: 180 EACH | Refills: 3 | Status: SHIPPED | OUTPATIENT
Start: 2025-04-28

## 2025-04-28 RX ORDER — MONTELUKAST SODIUM 10 MG/1
10 TABLET ORAL DAILY
Qty: 90 TABLET | Refills: 3 | Status: SHIPPED | OUTPATIENT
Start: 2025-04-28

## 2025-07-07 ENCOUNTER — OFFICE VISIT (OUTPATIENT)
Dept: MEDICAL GROUP | Facility: LAB | Age: 45
End: 2025-07-07
Payer: COMMERCIAL

## 2025-07-07 VITALS
OXYGEN SATURATION: 96 % | HEIGHT: 72 IN | DIASTOLIC BLOOD PRESSURE: 58 MMHG | HEART RATE: 91 BPM | TEMPERATURE: 97.1 F | BODY MASS INDEX: 21.28 KG/M2 | WEIGHT: 157.1 LBS | SYSTOLIC BLOOD PRESSURE: 112 MMHG | RESPIRATION RATE: 16 BRPM

## 2025-07-07 DIAGNOSIS — J34.2 DEVIATED SEPTUM: ICD-10-CM

## 2025-07-07 DIAGNOSIS — Z00.00 PREVENTATIVE HEALTH CARE: ICD-10-CM

## 2025-07-07 DIAGNOSIS — J45.30 MILD PERSISTENT ASTHMA WITHOUT COMPLICATION: ICD-10-CM

## 2025-07-07 PROCEDURE — 99214 OFFICE O/P EST MOD 30 MIN: CPT | Performed by: NURSE PRACTITIONER

## 2025-07-07 PROCEDURE — 3074F SYST BP LT 130 MM HG: CPT | Performed by: NURSE PRACTITIONER

## 2025-07-07 PROCEDURE — 3078F DIAST BP <80 MM HG: CPT | Performed by: NURSE PRACTITIONER

## 2025-07-07 RX ORDER — ALBUTEROL SULFATE 90 UG/1
2 INHALANT RESPIRATORY (INHALATION) EVERY 6 HOURS PRN
Qty: 3 EACH | Refills: 3 | Status: SHIPPED | OUTPATIENT
Start: 2025-07-07

## 2025-07-07 RX ORDER — MONTELUKAST SODIUM 10 MG/1
10 TABLET ORAL DAILY
Qty: 90 TABLET | Refills: 3 | Status: SHIPPED | OUTPATIENT
Start: 2025-07-07

## 2025-07-07 RX ORDER — PREDNISONE 20 MG/1
40 TABLET ORAL DAILY
Qty: 10 TABLET | Refills: 0 | Status: SHIPPED | OUTPATIENT
Start: 2025-07-07

## 2025-07-07 ASSESSMENT — PATIENT HEALTH QUESTIONNAIRE - PHQ9: CLINICAL INTERPRETATION OF PHQ2 SCORE: 0
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
## 2025-07-08 ENCOUNTER — HOSPITAL ENCOUNTER (OUTPATIENT)
Dept: LAB | Facility: MEDICAL CENTER | Age: 45
End: 2025-07-08
Attending: NURSE PRACTITIONER
Payer: COMMERCIAL

## 2025-07-08 DIAGNOSIS — Z00.00 PREVENTATIVE HEALTH CARE: ICD-10-CM

## 2025-07-08 LAB
ALBUMIN SERPL BCP-MCNC: 4.3 G/DL (ref 3.2–4.9)
ALBUMIN/GLOB SERPL: 1.7 G/DL
ALP SERPL-CCNC: 70 U/L (ref 30–99)
ALT SERPL-CCNC: 27 U/L (ref 2–50)
ANION GAP SERPL CALC-SCNC: 10 MMOL/L (ref 7–16)
AST SERPL-CCNC: 25 U/L (ref 12–45)
BASOPHILS # BLD AUTO: 0.7 % (ref 0–1.8)
BASOPHILS # BLD: 0.04 K/UL (ref 0–0.12)
BILIRUB SERPL-MCNC: 0.6 MG/DL (ref 0.1–1.5)
BUN SERPL-MCNC: 16 MG/DL (ref 8–22)
CALCIUM ALBUM COR SERPL-MCNC: 9.2 MG/DL (ref 8.5–10.5)
CALCIUM SERPL-MCNC: 9.4 MG/DL (ref 8.5–10.5)
CHLORIDE SERPL-SCNC: 105 MMOL/L (ref 96–112)
CHOLEST SERPL-MCNC: 208 MG/DL (ref 100–199)
CO2 SERPL-SCNC: 24 MMOL/L (ref 20–33)
CREAT SERPL-MCNC: 1.23 MG/DL (ref 0.5–1.4)
EOSINOPHIL # BLD AUTO: 0.18 K/UL (ref 0–0.51)
EOSINOPHIL NFR BLD: 3.1 % (ref 0–6.9)
ERYTHROCYTE [DISTWIDTH] IN BLOOD BY AUTOMATED COUNT: 44 FL (ref 35.9–50)
GFR SERPLBLD CREATININE-BSD FMLA CKD-EPI: 74 ML/MIN/1.73 M 2
GLOBULIN SER CALC-MCNC: 2.5 G/DL (ref 1.9–3.5)
GLUCOSE SERPL-MCNC: 93 MG/DL (ref 65–99)
HCT VFR BLD AUTO: 49.4 % (ref 42–52)
HDLC SERPL-MCNC: 43 MG/DL
HGB BLD-MCNC: 16.3 G/DL (ref 14–18)
IMM GRANULOCYTES # BLD AUTO: 0.01 K/UL (ref 0–0.11)
IMM GRANULOCYTES NFR BLD AUTO: 0.2 % (ref 0–0.9)
LDLC SERPL CALC-MCNC: 130 MG/DL
LYMPHOCYTES # BLD AUTO: 1.62 K/UL (ref 1–4.8)
LYMPHOCYTES NFR BLD: 27.7 % (ref 22–41)
MCH RBC QN AUTO: 30.7 PG (ref 27–33)
MCHC RBC AUTO-ENTMCNC: 33 G/DL (ref 32.3–36.5)
MCV RBC AUTO: 93 FL (ref 81.4–97.8)
MONOCYTES # BLD AUTO: 0.6 K/UL (ref 0–0.85)
MONOCYTES NFR BLD AUTO: 10.3 % (ref 0–13.4)
NEUTROPHILS # BLD AUTO: 3.39 K/UL (ref 1.82–7.42)
NEUTROPHILS NFR BLD: 58 % (ref 44–72)
NRBC # BLD AUTO: 0 K/UL
NRBC BLD-RTO: 0 /100 WBC (ref 0–0.2)
PLATELET # BLD AUTO: 240 K/UL (ref 164–446)
PMV BLD AUTO: 11 FL (ref 9–12.9)
POTASSIUM SERPL-SCNC: 4.3 MMOL/L (ref 3.6–5.5)
PROT SERPL-MCNC: 6.8 G/DL (ref 6–8.2)
RBC # BLD AUTO: 5.31 M/UL (ref 4.7–6.1)
SODIUM SERPL-SCNC: 139 MMOL/L (ref 135–145)
TRIGL SERPL-MCNC: 175 MG/DL (ref 0–149)
WBC # BLD AUTO: 5.8 K/UL (ref 4.8–10.8)

## 2025-07-08 PROCEDURE — 80061 LIPID PANEL: CPT

## 2025-07-08 PROCEDURE — 85025 COMPLETE CBC W/AUTO DIFF WBC: CPT

## 2025-07-08 PROCEDURE — 80053 COMPREHEN METABOLIC PANEL: CPT

## 2025-07-08 PROCEDURE — 36415 COLL VENOUS BLD VENIPUNCTURE: CPT

## 2025-07-11 NOTE — Clinical Note
REFERRAL APPROVAL NOTICE         Sent on July 11, 2025                   Rickdawit Perea Kiran  [address] [place] [address]                   Dear Mr. Beck,    After a careful review of the medical information and benefit coverage, [facility] has processed your referral. See below for additional details.    If applicable, you must be actively enrolled with your insurance for coverage of the authorized service. If you have any questions regarding your coverage, please contact your insurance directly.    REFERRAL INFORMATION   Referral #:  [id number]  Referred-To Provider    Referred-By Provider:  Otolaryngology    ASHWIN Langston ANTHONY C      [address]  [place] [id number] [phone] [address]  [place] [id number] [phone]    Referral Start Date:  07/07/2025  Referral End Date:   07/07/2026             SCHEDULING  If you do not already have an appointment, please call [phone] to make an appointment.     MORE INFORMATION  If you do not already have a [employer] account, sign up at: [email or web address]  You can access your medical information, make appointments, see lab results, billing information, and more.  If you have questions regarding this referral, please contact  the [facility] Referrals department at:             [phone]. Monday - Friday 8:00AM - 5:00PM.     Sincerely,    [facility]